# Patient Record
Sex: FEMALE | URBAN - METROPOLITAN AREA
[De-identification: names, ages, dates, MRNs, and addresses within clinical notes are randomized per-mention and may not be internally consistent; named-entity substitution may affect disease eponyms.]

---

## 2023-12-01 ENCOUNTER — APPOINTMENT (RX ONLY)
Dept: URBAN - METROPOLITAN AREA CLINIC 12 | Facility: CLINIC | Age: 43
Setting detail: DERMATOLOGY
End: 2023-12-01

## 2023-12-01 DIAGNOSIS — Z41.9 ENCOUNTER FOR PROCEDURE FOR PURPOSES OTHER THAN REMEDYING HEALTH STATE, UNSPECIFIED: ICD-10-CM

## 2023-12-01 PROCEDURE — ? COSMETIC CONSULTATION: IPL

## 2023-12-01 PROCEDURE — ? COSMETIC CONSULTATION: CHEMICAL PEELS

## 2023-12-01 PROCEDURE — ? OTHER

## 2023-12-01 PROCEDURE — ? COSMETIC CONSULTATION - MICRO-NEEDLING

## 2023-12-01 ASSESSMENT — LOCATION DETAILED DESCRIPTION DERM
LOCATION DETAILED: RIGHT MEDIAL FOREHEAD
LOCATION DETAILED: RIGHT INFERIOR MEDIAL FOREHEAD
LOCATION DETAILED: RIGHT SUPERIOR MEDIAL FOREHEAD

## 2023-12-01 ASSESSMENT — LOCATION ZONE DERM: LOCATION ZONE: FACE

## 2023-12-01 ASSESSMENT — LOCATION SIMPLE DESCRIPTION DERM: LOCATION SIMPLE: RIGHT FOREHEAD

## 2023-12-01 NOTE — HPI: COSMETIC CONSULTATION
When Outside In The Sun, Do You...: mostly burns, rarely tans
Additional History: Getting tattoos removed from another laser specialist. Considering other laser Tx’s at a few other laser clinics.

## 2023-12-01 NOTE — PROCEDURE: OTHER
Other (Free Text): Alessio\\n$500/Tx arms/hands\\n$450/Tx chest\\n$650/Tx legs\\n$650/Tx back
Detail Level: Zone
Render Risk Assessment In Note?: no
Note Text (......Xxx Chief Complaint.): This diagnosis correlates with the
Other (Free Text): $750/Tx face or $1800 series of 3
Other (Free Text): WiQo series $1200

## 2023-12-05 ENCOUNTER — APPOINTMENT (RX ONLY)
Dept: URBAN - METROPOLITAN AREA CLINIC 12 | Facility: CLINIC | Age: 43
Setting detail: DERMATOLOGY
End: 2023-12-05

## 2023-12-05 DIAGNOSIS — Z41.9 ENCOUNTER FOR PROCEDURE FOR PURPOSES OTHER THAN REMEDYING HEALTH STATE, UNSPECIFIED: ICD-10-CM

## 2023-12-05 PROCEDURE — ? PALOMAR ICON LASER

## 2023-12-05 PROCEDURE — ? OTHER

## 2023-12-05 PROCEDURE — ? COSMETIC CONSULTATION: PRODUCTS

## 2023-12-05 ASSESSMENT — LOCATION SIMPLE DESCRIPTION DERM
LOCATION SIMPLE: LEFT FOREARM
LOCATION SIMPLE: RIGHT FOREARM

## 2023-12-05 ASSESSMENT — LOCATION ZONE DERM: LOCATION ZONE: ARM

## 2023-12-05 ASSESSMENT — LOCATION DETAILED DESCRIPTION DERM
LOCATION DETAILED: RIGHT PROXIMAL DORSAL FOREARM
LOCATION DETAILED: LEFT PROXIMAL DORSAL FOREARM

## 2023-12-05 NOTE — PROCEDURE: PALOMAR ICON LASER
Overlap: 10%
Fluence: 26
Fluence: 24
Fluence Units: J/cm2
Detail Level: Zone
Post-Care Instructions: I reviewed with the patient in detail post-care instructions. Patient should stay away from the sun and wear sun protection until treated areas are fully healed.
Treatment Number: 1
Price (Use Numbers Only, No Special Characters Or $): 500
Consent: Written consent obtained, risks reviewed including but not limited to crusting, scabbing, blistering, scarring, darker or lighter pigmentary change, bruising, and/or incomplete response.
Pulse Duration (In Milliseconds): 20
Total Pulses (Will Not Render If Blank): 272
Render Post Care In The Note?: yes
Treated Area: large area
Location Override: arms
Fluence: 25
Pre-Procedure Text: The patient's skin was cleaned.
External Cooling: contact cooling

## 2023-12-05 NOTE — PROCEDURE: OTHER
Detail Level: Zone
Render Risk Assessment In Note?: no
Other (Free Text): [facility] and [facility]
Note Text (......Xxx Chief Complaint.): This diagnosis correlates with the

## 2023-12-05 NOTE — HPI: COSMETIC (IPL)
Have You Had Laser Removal Of Brown Spots Before?: has not had previous treatment
When Outside In The Sun, Do You...: rarely burns, tans with ease
Eye Color: green

## 2024-01-04 ENCOUNTER — APPOINTMENT (RX ONLY)
Dept: URBAN - METROPOLITAN AREA CLINIC 12 | Facility: CLINIC | Age: 44
Setting detail: DERMATOLOGY
End: 2024-01-04

## 2024-01-04 DIAGNOSIS — Z41.9 ENCOUNTER FOR PROCEDURE FOR PURPOSES OTHER THAN REMEDYING HEALTH STATE, UNSPECIFIED: ICD-10-CM

## 2024-01-04 PROCEDURE — ? PALOMAR ICON LASER

## 2024-01-04 ASSESSMENT — LOCATION SIMPLE DESCRIPTION DERM
LOCATION SIMPLE: RIGHT FOREARM
LOCATION SIMPLE: LEFT FOREARM

## 2024-01-04 ASSESSMENT — LOCATION DETAILED DESCRIPTION DERM
LOCATION DETAILED: RIGHT PROXIMAL DORSAL FOREARM
LOCATION DETAILED: LEFT PROXIMAL DORSAL FOREARM

## 2024-01-04 ASSESSMENT — LOCATION ZONE DERM: LOCATION ZONE: ARM

## 2024-01-04 NOTE — PROCEDURE: PALOMAR ICON LASER
Fluence Units: J/cm2
Overlap: 10%
Detail Level: Zone
Post-Care Instructions: I reviewed with the patient in detail post-care instructions. Patient should stay away from the sun and wear sun protection until treated areas are fully healed.
Fluence: 24
External Cooling: contact cooling
Price (Use Numbers Only, No Special Characters Or $): 0
Pulse Duration (In Milliseconds): 20
Pulse Duration (In Milliseconds): 15
Treated Area: large area
Render Post Care In The Note?: yes
Treatment Number: 2
Consent: Written consent obtained, risks reviewed including but not limited to crusting, scabbing, blistering, scarring, darker or lighter pigmentary change, bruising, and/or incomplete response.
Location: decolletage of the chest
Pre-Procedure Text: The patient's skin was cleaned.
Fluence: 25
Number Of Passes: 1
Fluence: 26
Total Pulses (Will Not Render If Blank): 119

## 2024-01-04 NOTE — HPI: COSMETIC FOLLOW UP
How Did You Tolerate The Procedure?: well, without problems
What Condition Are We Treating?: Dyschromia
What Procedure Did We Perform At The Last Visit?: IPL